# Patient Record
Sex: FEMALE | Race: WHITE | Employment: PART TIME | URBAN - METROPOLITAN AREA
[De-identification: names, ages, dates, MRNs, and addresses within clinical notes are randomized per-mention and may not be internally consistent; named-entity substitution may affect disease eponyms.]

---

## 2019-03-06 ENCOUNTER — TELEPHONE (OUTPATIENT)
Dept: PRIMARY CARE CLINIC | Age: 24
End: 2019-03-06

## 2019-03-06 ENCOUNTER — HOSPITAL ENCOUNTER (OUTPATIENT)
Dept: GENERAL RADIOLOGY | Age: 24
Discharge: HOME | End: 2019-03-06
Payer: COMMERCIAL

## 2019-03-06 ENCOUNTER — OFFICE VISIT (OUTPATIENT)
Dept: PRIMARY CARE CLINIC | Age: 24
End: 2019-03-06

## 2019-03-06 VITALS
DIASTOLIC BLOOD PRESSURE: 77 MMHG | SYSTOLIC BLOOD PRESSURE: 124 MMHG | HEIGHT: 69 IN | HEART RATE: 62 BPM | TEMPERATURE: 97.9 F | OXYGEN SATURATION: 98 % | RESPIRATION RATE: 18 BRPM | WEIGHT: 254.4 LBS | BODY MASS INDEX: 37.68 KG/M2

## 2019-03-06 DIAGNOSIS — Z83.3 FAMILY HISTORY OF DIABETES MELLITUS: ICD-10-CM

## 2019-03-06 DIAGNOSIS — S99.921A TOE INJURY, RIGHT, INITIAL ENCOUNTER: ICD-10-CM

## 2019-03-06 DIAGNOSIS — Z00.00 ENCOUNTER FOR WELL ADULT EXAM WITHOUT ABNORMAL FINDINGS: Primary | ICD-10-CM

## 2019-03-06 DIAGNOSIS — E66.01 SEVERE OBESITY (HCC): ICD-10-CM

## 2019-03-06 PROCEDURE — 73630 X-RAY EXAM OF FOOT: CPT

## 2019-03-06 RX ORDER — IBUPROFEN 600 MG/1
600 TABLET ORAL
Qty: 30 TAB | Refills: 0 | Status: SHIPPED | OUTPATIENT
Start: 2019-03-06

## 2019-03-06 RX ORDER — ASCORBIC ACID 250 MG
TABLET ORAL
COMMUNITY

## 2019-03-06 RX ORDER — BISMUTH SUBSALICYLATE 262 MG
1 TABLET,CHEWABLE ORAL DAILY
COMMUNITY

## 2019-03-06 RX ORDER — NORGESTIMATE AND ETHINYL ESTRADIOL 0.25-0.035
KIT ORAL
Refills: 5 | COMMUNITY
Start: 2019-02-27

## 2019-03-06 NOTE — PROGRESS NOTES
Please call patient:    Xray is normal, there is no fracture or dislocation. Treatment as discussed during visit. Follow-up if symptoms worsen or do not improve.

## 2019-03-06 NOTE — TELEPHONE ENCOUNTER
----- Message from Roxana Perez NP sent at 6/4/7642  3:59 PM EST -----  Please call patient:    Xray is normal, there is no fracture or dislocation. Treatment as discussed during visit. Follow-up if symptoms worsen or do not improve.

## 2019-03-06 NOTE — PROGRESS NOTES
HPI:     Chief Complaint   Patient presents with    Complete Physical    Toe Pain     hit right pinky toe yesterday, no has a bruise       Preethi Armas is a 26 y.o. female with no significant history who presents as new patient for annual exam as well as evaluation of right toe pain. Otherwise patient feels well today and has no concerns or complaints. Patient reports she stubbed her right 5th toe yesterday on a doorframe. She reports moderate pain, swelling and bruising afterwards. Reports pain has significantly improved from yesterday. Swelling and bruising is also better today. She is able to bear weight and ambulate without difficulty. She has been icing the area. Has not taken any OTC. Patient follows with OB/GYN Dr. Kesha Hobbs [facility]) for routine breast and gyne care. Reports pap in May 2018 was normal.  She is on Sprintec birth control for past approximately 5 years. LMP 2/25/19. Reports periods are normal, occur each month, and last about 7 days. Health Maintenance:   TDaP: thinks she got this before college 4-5 years ago   Pap smear: May 2018 with GYN; normal per patient report   Mammogram: not indicated; no family hx of breast or ovarian cancer  Colonoscopy: not indicated; no family hx of colorectal cancer     Healthy Habits:  Patient enjoys running. Goes for run 2-3 times per week when the weather is warm. Admits to limited exercise during colder months, but is excited to get outside again. Admits to somewhat unhealthy diet, eats out 1-2 times per week. Tries to avoid fried, fatty foods and has been trying to incorporate more fruits and vegetables. Pertient past medical hstory: no history of HTN, DVT, CAD, DM, liver disease, migraines or smoking.       Patient Active Problem List   Diagnosis Code    Severe obesity ([facility] [address] E66.01    Family history of diabetes mellitus Z83.3     Current Outpatient Medications   Medication Sig Dispense Refill    SPRINTEC, 28, 0.25-35 mg-mcg tab TAKE 1 TABLET BY MOUTH EVERY DAY  5    multivitamin (ONE A DAY) tablet Take 1 Tab by mouth daily.  ascorbic acid, vitamin C, (VITAMIN C) 250 mg tablet Take  by mouth.  ibuprofen (MOTRIN) 600 mg tablet Take 1 Tab by mouth every eight (8) hours as needed for Pain. 30 Tab 0     No Known Allergies     History reviewed. No pertinent past medical history. Past Surgical History:   Procedure Laterality Date    HX WISDOM TEETH EXTRACTION       Family History   Problem Relation Age of Onset    No Known Problems Mother     Diabetes Father         type 2    No Known Problems Brother     No Known Problems Maternal Grandmother     Kidney Disease Maternal Grandfather         dialysis    Heart Disease Maternal Grandfather     Heart Attack Paternal Grandmother          young   Kevin Parisian Hypertension Paternal Grandfather      Social History     Tobacco Use    Smoking status: Never Smoker    Smokeless tobacco: Never Used   Substance Use Topics    Alcohol use: Yes     Frequency: Monthly or less     Comment: socially          ROS:     ROS:  Feeling well. No dyspnea, palpitations, or chest pain on exertion. No abdominal pain, change in appetite, nausea, vomiting, change in bowel habits, black or bloody stools. No urinary tract symptoms. GYN ROS: normal menses, no abnormal bleeding, pelvic pain or discharge, no breast pain or new or enlarging lumps on self exam. No neurological complaints. No fever or chills. Otherwise, as documented in HPI. Physical Exam:     Vitals:    19 1426   BP: 124/77   Pulse: 62   Resp: 18   Temp: 97.9 °F (36.6 °C)   TempSrc: Oral   SpO2: 98%   Weight: 254 lb 6.4 oz (115.4 kg)   Height: 5' 8.5\" (1.74 m)        Nursing Documentation and Vital Signs Reviewed.      Physical Examination:   General appearance - alert, well appearing, and in no distress  Mental status - alert, oriented to person, place, and time, normal mood, behavior, speech, dress, motor activity, and thought processes  Eyes - pupils equal and reactive, extraocular eye movements intact  Ears - bilateral TM's and external ear canals normal  Nose - normal and patent, no erythema, discharge or polyps  Mouth - mucous membranes moist, pharynx normal without lesions  Neck - supple, no significant adenopathy, thyroid is normal in size without nodules or tenderness  Chest - clear to auscultation, no wheezes, rales or rhonchi, symmetric air entry  Heart - normal rate, regular rhythm, normal S1, S2, no murmurs, rubs, clicks or gallops  Abdomen - soft, nontender, nondistended, no masses or organomegaly  Neurological - alert, oriented, normal speech, no focal findings or movement disorder noted, DTR's normal and symmetric, normal muscle tone, no tremors, strength 5/5  Musculoskeletal - right 5th toe with slight swelling, bruising, mild tenderness over proximal phalanx. No tenderness with palpation of metatarsal bones. No obvious deformity. Ankle is normal.  Left foot is normal.    Extremities - peripheral pulses normal, no pedal edema, no clubbing or cyanosis      Assessment/ Plan:   Healthy adult female. Full age appropriate history and physical exam as well as health care maintenance  performed and discussed today. Risk factor modification discussed today includes safe sex practices, healthy diet and exercise, and seat belt use. Counseled on breast self-exam and mammography schedule. Pap smear schedule reviewed. Continue current medications as prescribed. Diagnoses and all orders for this visit:    1. Encounter for well adult exam without abnormal findings  -     CBC WITH AUTOMATED DIFF  -     HEMOGLOBIN A1C WITH EAG  -     LIPID PANEL  -     METABOLIC PANEL, COMPREHENSIVE  -     TSH AND FREE T4    2. Toe injury, right, initial encounter  -     Patient stubbed right 5th toe on door yesterday with subsequent tenderness, swelling, bruising. Has improved today. Will send for xray.     -     Start ibuprofen (MOTRIN) 600 mg tablet; Take 1 Tab by mouth every eight (8) hours as needed for Pain. Advised to take NSAID with food. CV and GI adverse effects discussed with patient. No history of PUD or GI bleed. Advised not to take ibuprofen and any other NSAID concurrently. Advised to take every 8 hours for next 2-3 days then as needed. -     Advised ice for 20 minute intervals four times daily for the next 2-3 days  -     XR FOOT RT MIN 3 V; Future    3. Family history of diabetes mellitus  -     HEMOGLOBIN A1C WITH EAG    4. Severe obesity ([address]  -     BMI discussed with patient. Discussed lifestyle changes, daily physical activity, and advised 150 minutes of exercise weekly. Discussed healthy diet choices and limiting fried, fatty foods, fast foods, processed foods, sugar-sweetened beverages/soda, and added sugars. Increase fruits, vegetables, low-fat dairy products, lean proteins, and whole grains.    -     HEMOGLOBIN A1C WITH EAG  -     LIPID PANEL      Follow-up Disposition:  Return if symptoms worsen or fail to improve. Discussed expected course/resolution/complications of diagnosis in detail with patient.    Medication risks/benefits/costs/interactions/alternatives discussed with patient.    Pt was given after visit summary which includes diagnoses, current medications & vitals.    Pt expressed understanding with the diagnosis and plan

## 2019-03-06 NOTE — PATIENT INSTRUCTIONS
Well Visit, Ages 25 to 48: Care Instructions  Your Care Instructions    Physical exams can help you stay healthy. Your doctor has checked your overall health and may have suggested ways to take good care of yourself. He or she also may have recommended tests. At home, you can help prevent illness with healthy eating, regular exercise, and other steps. Follow-up care is a key part of your treatment and safety. Be sure to make and go to all appointments, and call your doctor if you are having problems. It's also a good idea to know your test results and keep a list of the medicines you take. How can you care for yourself at home? · Reach and stay at a healthy weight. This will lower your risk for many problems, such as obesity, diabetes, heart disease, and high blood pressure. · Get at least 30 minutes of physical activity on most days of the week. Walking is a good choice. You also may want to do other activities, such as running, swimming, cycling, or playing tennis or team sports. Discuss any changes in your exercise program with your doctor. · Do not smoke or allow others to smoke around you. If you need help quitting, talk to your doctor about stop-smoking programs and medicines. These can increase your chances of quitting for good. · Talk to your doctor about whether you have any risk factors for sexually transmitted infections (STIs). Having one sex partner (who does not have STIs and does not have sex with anyone else) is a good way to avoid these infections. · Use birth control if you do not want to have children at this time. Talk with your doctor about the choices available and what might be best for you. · Protect your skin from too much sun. When you're outdoors from 10 a.m. to 4 p.m., stay in the shade or cover up with clothing and a hat with a wide brim. Wear sunglasses that block UV rays. Even when it's cloudy, put broad-spectrum sunscreen (SPF 30 or higher) on any exposed skin.   · See a dentist one or two times a year for checkups and to have your teeth cleaned. · Wear a seat belt in the car. · Drink alcohol in moderation, if at all. That means no more than 2 drinks a day for men and 1 drink a day for women. Follow your doctor's advice about when to have certain tests. These tests can spot problems early. For everyone  · Cholesterol. Have the fat (cholesterol) in your blood tested after age 21. Your doctor will tell you how often to have this done based on your age, family history, or other things that can increase your risk for heart disease. · Blood pressure. Have your blood pressure checked during a routine doctor visit. Your doctor will tell you how often to check your blood pressure based on your age, your blood pressure results, and other factors. · Vision. Talk with your doctor about how often to have a glaucoma test.  · Diabetes. Ask your doctor whether you should have tests for diabetes. · Colon cancer. Have a test for colon cancer at age 48. You may have one of several tests. If you are younger than 48, you may need a test earlier if you have any risk factors. Risk factors include whether you already had a precancerous polyp removed from your colon or whether your parent, brother, sister, or child has had colon cancer. For women  · Breast exam and mammogram. Talk to your doctor about when you should have a clinical breast exam and a mammogram. Medical experts differ on whether and how often women under 50 should have these tests. Your doctor can help you decide what is right for you. · Pap test and pelvic exam. Begin Pap tests at age 24. A Pap test is the best way to find cervical cancer. The test often is part of a pelvic exam. Ask how often to have this test.  · Tests for sexually transmitted infections (STIs). Ask whether you should have tests for STIs. You may be at risk if you have sex with more than one person, especially if your partners do not wear condoms.   For men  · Tests for sexually transmitted infections (STIs). Ask whether you should have tests for STIs. You may be at risk if you have sex with more than one person, especially if you do not wear a condom. · Testicular cancer exam. Ask your doctor whether you should check your testicles regularly. · Prostate exam. Talk to your doctor about whether you should have a blood test (called a PSA test) for prostate cancer. Experts differ on whether and when men should have this test. Some experts suggest it if you are older than 39 and are -American or have a father or brother who got prostate cancer when he was younger than 72. When should you call for help? Watch closely for changes in your health, and be sure to contact your doctor if you have any problems or symptoms that concern you. Where can you learn more? Go to http://davian-bo.info/. Enter P072 in the search box to learn more about \"Well Visit, Ages 25 to 48: Care Instructions. \"  Current as of: March 28, 2018  Content Version: 11.9  © 7026-6871 [employer]. Care instructions adapted under license by [employer] (which disclaims liability or warranty for this information). If you have questions about a medical condition or this instruction, always ask your healthcare professional. [place] [address] any warranty or liability for your use of this information. Foot Pain: Care Instructions  Your Care Instructions  Foot injuries that cause pain and swelling are fairly common. Almost all sports or home repair projects can cause a misstep that ends up as foot pain. Normal wear and tear, especially as you get older, also can cause foot pain. Most minor foot injuries will heal on their own, and home treatment is usually all you need to do. If you have a severe injury, you may need tests and treatment. Follow-up care is a key part of your treatment and safety.  Be sure to make and go to all appointments, and call your doctor if you are having problems. It's also a good idea to know your test results and keep a list of the medicines you take. How can you care for yourself at home? · Take pain medicines exactly as directed. ? If the doctor gave you a prescription medicine for pain, take it as prescribed. ? If you are not taking a prescription pain medicine, ask your doctor if you can take an over-the-counter medicine. · Rest and protect your foot. Take a break from any activity that may cause pain. · Put ice or a cold pack on your foot for 10 to 20 minutes at a time. Put a thin cloth between the ice and your skin. · Prop up the sore foot on a pillow when you ice it or anytime you sit or lie down during the next 3 days. Try to keep it above the level of your heart. This will help reduce swelling. · Your doctor may recommend that you wrap your foot with an elastic bandage. Keep your foot wrapped for as long as your doctor advises. · If your doctor recommends crutches, use them as directed. · Wear roomy footwear. · As soon as pain and swelling end, begin gentle exercises of your foot. Your doctor can tell you which exercises will help. When should you call for help? Call 911 anytime you think you may need emergency care. For example, call if:    · Your foot turns pale, white, blue, or cold.    Call your doctor now or seek immediate medical care if:    · You cannot move or stand on your foot.     · Your foot looks twisted or out of its normal position.     · Your foot is not stable when you step down.     · You have signs of infection, such as:  ? Increased pain, swelling, warmth, or redness. ? Red streaks leading from the sore area. ? Pus draining from a place on your foot.   ? A fever.     · Your foot is numb or tingly.    Watch closely for changes in your health, and be sure to contact your doctor if:    · You do not get better as expected.     · You have bruises from an injury that last longer than 2 weeks. Where can you learn more? Go to http://davian-bo.info/. Enter J712 in the search box to learn more about \"Foot Pain: Care Instructions. \"  Current as of: September 20, 2018  Content Version: 11.9  © 5078-7832 [employer]. Care instructions adapted under license by [employer] (which disclaims liability or warranty for this information). If you have questions about a medical condition or this instruction, always ask your healthcare professional. [address] any warranty or liability for your use of this information.

## 2020-01-15 ENCOUNTER — OFFICE VISIT (OUTPATIENT)
Dept: PRIMARY CARE CLINIC | Age: 25
End: 2020-01-15

## 2020-01-15 VITALS
SYSTOLIC BLOOD PRESSURE: 113 MMHG | HEART RATE: 53 BPM | RESPIRATION RATE: 16 BRPM | HEIGHT: 69 IN | BODY MASS INDEX: 34.45 KG/M2 | TEMPERATURE: 98.2 F | WEIGHT: 232.6 LBS | OXYGEN SATURATION: 99 % | DIASTOLIC BLOOD PRESSURE: 72 MMHG

## 2020-01-15 DIAGNOSIS — N89.8 VAGINAL PRURITUS: ICD-10-CM

## 2020-01-15 DIAGNOSIS — N89.8 VAGINAL DISCHARGE: Primary | ICD-10-CM

## 2020-01-15 RX ORDER — FLUCONAZOLE 150 MG/1
150 TABLET ORAL ONCE
Qty: 2 TAB | Refills: 0 | Status: SHIPPED | OUTPATIENT
Start: 2020-01-15 | End: 2020-01-15

## 2020-01-15 NOTE — PROGRESS NOTES
HPI:     Chief Complaint   Patient presents with    Vaginal Itching     itching and irritation, having white discharge, no odor, noticed it on sunday. Patient is a 22 y.o. female with no significant history who presents for evaluation of vaginal pruritus. Patient reports 3 day history of vaginal irritation, itching, and odorless white discharge. Denies dysuria or urinary symptoms. Denies fever, chills, suprapubic/abdominal pain, vaginal bleeding, nausea, vomiting. Patient Active Problem List   Diagnosis Code    [facility] E66.01    Family history of diabetes mellitus Z83.3     Current Outpatient Medications   Medication Sig Dispense Refill    fluconazole (DIFLUCAN) 150 mg tablet Take 1 Tab by mouth once for 1 dose. May repeat in 3-4 days if symptoms persist.  FDA advises cautious during pregnancy. 2 Tab 0    SPRINTEC, 28, 0.25-35 mg-mcg tab TAKE 1 TABLET BY MOUTH EVERY DAY  5    multivitamin (ONE A DAY) tablet Take 1 Tab by mouth daily.  ascorbic acid, vitamin C, (VITAMIN C) 250 mg tablet Take  by mouth.  ibuprofen (MOTRIN) 600 mg tablet Take 1 Tab by mouth every eight (8) hours as needed for Pain. 30 Tab 0     No Known Allergies  History reviewed. No pertinent past medical history. ROS:   Pertinent items are noted in HPI. Objective:     Vitals:    01/15/20 0946   BP: 113/72   Pulse: (!) 53   Resp: 16   Temp: 98.2 °F (36.8 °C)   TempSrc: Oral   SpO2: 99%   Weight: 232 lb 9.6 oz (105.5 kg)   Height: 5' 8.5\" (1.74 m)        Vitals and Nurse Documentation reviewed.     Physical Examination:   General appearance - alert, well appearing, and in no distress  Mental status - alert, oriented to person, place, and time, normal mood, behavior, speech, dress, motor activity, and thought processes  Chest - clear to auscultation, no wheezes, rales or rhonchi, symmetric air entry  Heart - normal rate, regular rhythm, normal S1, S2, no murmurs, rubs, clicks or gallops  Abdomen - soft, nontender, nondistended, no masses or organomegaly  Pelvic - VULVA: normal appearing vulva with no masses, tenderness or lesions, VAGINA: normal appearing vagina with moderate odorless white discharge noted, CERVIX: normal appearing cervix with odorless white discharge noted, UTERUS: uterus is normal size, shape, consistency and nontender, ADNEXA: normal adnexa in size, nontender and no masses, exam chaperoned by Zenobia Macdonald      Assessment/ Plan:   Diagnoses and all orders for this visit:    1. Vaginal discharge  -     NUSWAB VAGINITIS PLUS. Will notify of results and recommendation.   -     fluconazole (DIFLUCAN) 150 mg tablet; Take 1 Tab by mouth once for 1 dose. May repeat in 3-4 days if symptoms persist.  FDA advises cautious during pregnancy. Medication benefits, risks, indication, dosage, potential adverse effects, and alternate medication options were discussed with patient who expressed understanding. 2. Vaginal pruritus  -     NUSWAB VAGINITIS PLUS  -     fluconazole (DIFLUCAN) 150 mg tablet; Take 1 Tab by mouth once for 1 dose. May repeat in 3-4 days if symptoms persist.  FDA advises cautious during pregnancy. Follow-up and Dispositions    · Return if symptoms worsen or fail to improve. I have discussed the diagnosis with the patient and the intended plan as seen in the above orders. Advised prompt follow-up if symptoms worsen or fail to improve and symptoms that would warrant emergent evaluation in ED. The patient has received an after-visit summary and questions were answered concerning future plans. I have discussed medication side effects and warnings with the patient as well. Patient expressed understanding and is in agreement with the diagnosis and plan.

## 2020-01-15 NOTE — PROGRESS NOTES
Ilene Granado is a 22 y.o. female    Chief Complaint   Patient presents with    Vaginal Itching     itching and irritation, having white discharge, no odor, noticed it on sunday. 1. Have you been to the ER, urgent care clinic since your last visit? Hospitalized since your last visit? No    2. Have you seen or consulted any other health care providers outside of the [address] [place] [address] since your last visit? Include any pap smears or colon screening. No    No flowsheet data found.      Health Maintenance Due   Topic Date Due    HPV Age 9Y-34Y (3 - Female 2-dose series) 07/09/2007    DTaP/Tdap/Td series (1 - Tdap) 07/09/2007    Influenza Age 5 to Adult  08/01/2019

## 2020-01-18 LAB
A VAGINAE DNA VAG QL NAA+PROBE: NORMAL SCORE
BVAB2 DNA VAG QL NAA+PROBE: NORMAL SCORE
C ALBICANS DNA VAG QL NAA+PROBE: NEGATIVE
C GLABRATA DNA VAG QL NAA+PROBE: NEGATIVE
C TRACH RRNA SPEC QL NAA+PROBE: NEGATIVE
MEGA1 DNA VAG QL NAA+PROBE: NORMAL SCORE
N GONORRHOEA RRNA SPEC QL NAA+PROBE: NEGATIVE
T VAGINALIS RRNA SPEC QL NAA+PROBE: NEGATIVE

## 2020-07-22 ENCOUNTER — OFFICE VISIT (OUTPATIENT)
Dept: PRIMARY CARE CLINIC | Age: 25
End: 2020-07-22

## 2020-07-22 VITALS
OXYGEN SATURATION: 97 % | HEIGHT: 68 IN | HEART RATE: 66 BPM | RESPIRATION RATE: 18 BRPM | DIASTOLIC BLOOD PRESSURE: 72 MMHG | TEMPERATURE: 97.6 F | BODY MASS INDEX: 28.55 KG/M2 | WEIGHT: 188.4 LBS | SYSTOLIC BLOOD PRESSURE: 114 MMHG

## 2020-07-22 DIAGNOSIS — Z78.9 HEPATITIS B VACCINATION STATUS UNKNOWN: ICD-10-CM

## 2020-07-22 DIAGNOSIS — E55.9 VITAMIN D DEFICIENCY: ICD-10-CM

## 2020-07-22 DIAGNOSIS — Z22.7 LATENT TUBERCULOSIS BY BLOOD TEST: ICD-10-CM

## 2020-07-22 DIAGNOSIS — Z00.00 ANNUAL PHYSICAL EXAM: Primary | ICD-10-CM

## 2020-07-22 NOTE — PROGRESS NOTES
Yue Guerrero is a 26 y.o.  female and presents with     Chief Complaint   Patient presents with    Physical     Pt is here for a physical. 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 She feels fine  She needs form filled. No past medical history on file. Past Surgical History:   Procedure Laterality Date    HX WISDOM TEETH EXTRACTION       Current Outpatient Medications   Medication Sig    SPRINTEC, 28, 0.25-35 mg-mcg tab TAKE 1 TABLET BY MOUTH EVERY DAY    multivitamin (ONE A DAY) tablet Take 1 Tab by mouth daily.  ascorbic acid, vitamin C, (VITAMIN C) 250 mg tablet Take  by mouth.  ibuprofen (MOTRIN) 600 mg tablet Take 1 Tab by mouth every eight (8) hours as needed for Pain. No current facility-administered medications for this visit. Health Maintenance   Topic Date Due    HPV Age 9Y-34Y (1 - 2-dose series) 2007    DTaP/Tdap/Td series (1 - Tdap) 2017    Influenza Age 5 to Adult  2020    PAP AKA CERVICAL CYTOLOGY  2022    Pneumococcal 0-64 years  Aged [employer] History   Administered Date(s) Administered    Hep B Vaccine (Adult) 2020    Influenza Vaccine 10/06/2018, 2019     Patient's last menstrual period was 2020 (exact date). Allergies and Intolerances:   No Known Allergies    Family History:   Family History   Problem Relation Age of Onset    No Known Problems Mother     Diabetes Father         type 2    No Known Problems Brother     No Known Problems Maternal Grandmother     Kidney Disease Maternal Grandfather         dialysis    Heart Disease Maternal Grandfather     Heart Attack Paternal Grandmother          young   [address] [facility] [address] Hypertension Paternal Grandfather        Social History:   She  reports that she has never smoked. She has never used smokeless tobacco.  She  reports current alcohol use.             Review of Systems:   General: negative for - chills, fatigue, fever, weight change  Psych: negative for - anxiety, depression, irritability or mood swings  ENT: negative for - headaches, hearing change, nasal congestion, oral lesions, sneezing or sore throat  Heme/ Lymph: negative for - bleeding problems, bruising, pallor or swollen lymph nodes  Endo: negative for - hot flashes, polydipsia/polyuria or temperature intolerance  Resp: negative for - cough, shortness of breath or wheezing  CV: negative for - chest pain, edema or palpitations  GI: negative for - abdominal pain, change in bowel habits, constipation, diarrhea or nausea/vomiting  : negative for - dysuria, hematuria, incontinence, pelvic pain or vulvar/vaginal symptoms  MSK: negative for - joint pain, joint swelling or muscle pain  Neuro: negative for - confusion, headaches, seizures or weakness  Derm: negative for - dry skin, hair changes, rash or skin lesion changes          Physical:   Vitals:   Vitals:    07/22/20 1035   BP: 114/72   Pulse: 66   Resp: 18   Temp: 97.6 °F (36.4 °C)   TempSrc: Temporal   SpO2: 97%   Weight: 188 lb 6.4 oz (85.5 kg)   Height: 5' 8\" (1.727 m)           Exam:   HEENT- atraumatic,normocephalic, awake, oriented, well nourished  Neck - supple,no enlarged lymph nodes, no JVD, no thyromegaly  Chest- CTA, no rhonchi, no crackles  Heart- rrr, no murmurs / gallop/rub  Abdomen- soft,BS+,NT, no hepatosplenomegaly  Ext - no c/c/edema   Neuro- no focal deficits. Power 5/5 all extremities  Skin - warm,dry, no obvious rashes.           Review of Data:   LABS:   Lab Results   Component Value Date/Time    WBC 7.0 07/22/2020 11:24 AM    HGB 12.8 07/22/2020 11:24 AM    HCT 37.9 07/22/2020 11:24 AM    PLATELET 285 03/09/2168 11:24 AM     Lab Results   Component Value Date/Time    Sodium 141 07/22/2020 11:24 AM    Potassium 4.6 07/22/2020 11:24 AM    Chloride 100 07/22/2020 11:24 AM    CO2 26 07/22/2020 11:24 AM    Glucose 90 07/22/2020 11:24 AM    BUN 12 07/22/2020 11:24 AM    Creatinine 0.77 07/22/2020 11:24 AM     Lab Results Component Value Date/Time    Cholesterol, total 247 (H) 07/22/2020 11:24 AM    HDL Cholesterol 62 07/22/2020 11:24 AM    LDL, calculated 152 (H) 07/22/2020 11:24 AM    Triglyceride 166 (H) 07/22/2020 11:24 AM     No components found for: GPT        Impression / Plan:        ICD-10-CM ICD-9-CM    1. Annual physical exam  Z00.00 V70.0 CBC WITH AUTOMATED DIFF      METABOLIC PANEL, COMPREHENSIVE      LIPID PANEL      TSH 3RD GENERATION   2. Latent tuberculosis by blood test  Z22.7 790.6 QUANTIFERON-TB GOLD PLUS   3. Vitamin D deficiency  E55.9 268.9 VITAMIN D, 25 HYDROXY   4. Hepatitis B vaccination status unknown  Z78.9 V49.89 HEPATITIS B SURF AB QUANT         Explained to patient risk benefits of the medications. Advised patient to stop meds if having any side effects. Pt verbalized understanding of the instructions. I have discussed the diagnosis with the patient and the intended plan as seen in the above orders. The patient has received an after-visit summary and questions were answered concerning future plans. I have discussed medication side effects and warnings with the patient as well. I have reviewed the plan of care with the patient, accepted their input and they are in agreement with the treatment goals. Reviewed plan of care. Patient has provided input and agrees with goals.         Deepthi Atwood MD

## 2020-07-23 ENCOUNTER — TELEPHONE (OUTPATIENT)
Dept: PRIMARY CARE CLINIC | Age: 25
End: 2020-07-23

## 2020-07-23 NOTE — TELEPHONE ENCOUNTER
Pt calling in regards to her Physical form and immunization records. She wanted to provide the fax number the forms need to be sent to when completed. ATTN:   [address] [address]: [phone]    Pt would also like a call when forms are completed and would like to  a copy for herself.

## 2020-07-24 NOTE — TELEPHONE ENCOUNTER
Paper work has been given to Dr. Jacobo Hernandes, paper work to be fully completed on Monday with upon Dr. Jacobo Hernandes return

## 2020-07-25 LAB
25(OH)D3+25(OH)D2 SERPL-MCNC: 67.2 NG/ML (ref 30–100)
ALBUMIN SERPL-MCNC: 4.7 G/DL (ref 3.9–5)
ALBUMIN/GLOB SERPL: 2 {RATIO} (ref 1.2–2.2)
ALP SERPL-CCNC: 70 IU/L (ref 39–117)
ALT SERPL-CCNC: 20 IU/L (ref 0–32)
AST SERPL-CCNC: 20 IU/L (ref 0–40)
BASOPHILS # BLD AUTO: 0.1 X10E3/UL (ref 0–0.2)
BASOPHILS NFR BLD AUTO: 1 %
BILIRUB SERPL-MCNC: 0.5 MG/DL (ref 0–1.2)
BUN SERPL-MCNC: 12 MG/DL (ref 6–20)
BUN/CREAT SERPL: 16 (ref 9–23)
CALCIUM SERPL-MCNC: 10.4 MG/DL (ref 8.7–10.2)
CHLORIDE SERPL-SCNC: 100 MMOL/L (ref 96–106)
CHOLEST SERPL-MCNC: 247 MG/DL (ref 100–199)
CO2 SERPL-SCNC: 26 MMOL/L (ref 20–29)
CREAT SERPL-MCNC: 0.77 MG/DL (ref 0.57–1)
EOSINOPHIL # BLD AUTO: 0.1 X10E3/UL (ref 0–0.4)
EOSINOPHIL NFR BLD AUTO: 2 %
ERYTHROCYTE [DISTWIDTH] IN BLOOD BY AUTOMATED COUNT: 12.8 % (ref 11.7–15.4)
GAMMA INTERFERON BACKGROUND BLD IA-ACNC: 0.02 IU/ML
GLOBULIN SER CALC-MCNC: 2.4 G/DL (ref 1.5–4.5)
GLUCOSE SERPL-MCNC: 90 MG/DL (ref 65–99)
HBV SURFACE AB SER-ACNC: <3.1 MIU/ML
HCT VFR BLD AUTO: 37.9 % (ref 34–46.6)
HDLC SERPL-MCNC: 62 MG/DL
HGB BLD-MCNC: 12.8 G/DL (ref 11.1–15.9)
IMM GRANULOCYTES # BLD AUTO: 0 X10E3/UL (ref 0–0.1)
IMM GRANULOCYTES NFR BLD AUTO: 0 %
LDLC SERPL CALC-MCNC: 152 MG/DL (ref 0–99)
LYMPHOCYTES # BLD AUTO: 2.6 X10E3/UL (ref 0.7–3.1)
LYMPHOCYTES NFR BLD AUTO: 37 %
M TB IFN-G BLD-IMP: NEGATIVE
M TB IFN-G CD4+ BCKGRND COR BLD-ACNC: 0.09 IU/ML
MCH RBC QN AUTO: 30.7 PG (ref 26.6–33)
MCHC RBC AUTO-ENTMCNC: 33.8 G/DL (ref 31.5–35.7)
MCV RBC AUTO: 91 FL (ref 79–97)
MITOGEN IGNF BLD-ACNC: >10 IU/ML
MONOCYTES # BLD AUTO: 0.5 X10E3/UL (ref 0.1–0.9)
MONOCYTES NFR BLD AUTO: 6 %
NEUTROPHILS # BLD AUTO: 3.8 X10E3/UL (ref 1.4–7)
NEUTROPHILS NFR BLD AUTO: 54 %
PLATELET # BLD AUTO: 289 X10E3/UL (ref 150–450)
POTASSIUM SERPL-SCNC: 4.6 MMOL/L (ref 3.5–5.2)
PROT SERPL-MCNC: 7.1 G/DL (ref 6–8.5)
QUANTIFERON INCUBATION, QF1T: NORMAL
QUANTIFERON TB2 AG: 0.08 IU/ML
RBC # BLD AUTO: 4.17 X10E6/UL (ref 3.77–5.28)
SERVICE CMNT-IMP: NORMAL
SODIUM SERPL-SCNC: 141 MMOL/L (ref 134–144)
TRIGL SERPL-MCNC: 166 MG/DL (ref 0–149)
TSH SERPL DL<=0.005 MIU/L-ACNC: 3.52 UIU/ML (ref 0.45–4.5)
VLDLC SERPL CALC-MCNC: 33 MG/DL (ref 5–40)
WBC # BLD AUTO: 7 X10E3/UL (ref 3.4–10.8)

## 2020-07-27 NOTE — TELEPHONE ENCOUNTER
----- Message from Elliott Fajardo MD sent at 7/26/2020  9:40 PM EDT -----  Chol is elevated. Would advise diet, exercise, low fat diet. Bad chol lDL- 152, should be less than 100  Serum calcium is slightly elevated , I have ordered ionized calcium and intact PTH. Hep B titers are low.  Pt will need repeat vaccination series for Hep B

## 2020-07-27 NOTE — PROGRESS NOTES
Chol is elevated. Would advise diet, exercise, low fat diet. Bad chol lDL- 152, should be less than 100  Serum calcium is slightly elevated , I have ordered ionized calcium and intact PTH. Hep B titers are low.  Pt will need repeat vaccination series for Hep B

## 2020-07-27 NOTE — TELEPHONE ENCOUNTER
Message has been conveyed to patient per Dr. Rose Rizvi. Patient has verbalized understanding and no further questions were asked.  Patient to call office for nurse visit and lab appt

## 2020-07-29 ENCOUNTER — CLINICAL SUPPORT (OUTPATIENT)
Dept: PRIMARY CARE CLINIC | Age: 25
End: 2020-07-29

## 2020-07-29 VITALS
DIASTOLIC BLOOD PRESSURE: 67 MMHG | HEIGHT: 69 IN | TEMPERATURE: 98 F | SYSTOLIC BLOOD PRESSURE: 107 MMHG | BODY MASS INDEX: 27.99 KG/M2 | OXYGEN SATURATION: 98 % | WEIGHT: 189 LBS | RESPIRATION RATE: 15 BRPM | HEART RATE: 59 BPM

## 2020-07-29 DIAGNOSIS — E83.52 HYPERCALCEMIA: Primary | ICD-10-CM

## 2020-07-29 DIAGNOSIS — Z23 NEED FOR HEPATITIS B VACCINATION: ICD-10-CM

## 2020-07-29 DIAGNOSIS — Z23 ENCOUNTER FOR IMMUNIZATION: Primary | ICD-10-CM

## 2020-07-29 NOTE — PROGRESS NOTES
Hep B Immunization administered 7/29/2020 by Jelly Acuña with patient's consent in left arm. Patient tolerated procedure well. No reactions noted.

## 2020-07-30 LAB
CA-I SERPL ISE-MCNC: 5.2 MG/DL (ref 4.5–5.6)
PTH-INTACT SERPL-MCNC: 12 PG/ML (ref 15–65)

## 2020-07-31 ENCOUNTER — TELEPHONE (OUTPATIENT)
Dept: PRIMARY CARE CLINIC | Age: 25
End: 2020-07-31

## 2020-07-31 NOTE — PROGRESS NOTES
Ionized calcium is normal .Intact PTH is low . However I would be concerned if it was high.  NO concerns at this time

## 2020-07-31 NOTE — TELEPHONE ENCOUNTER
Message has been conveyed to patient per Dr. Troy Balderrama.  Patient has verbalized understanding and no further questions were asekd

## 2020-07-31 NOTE — TELEPHONE ENCOUNTER
----- Message from Amanda Gonzalez MD sent at 7/31/2020 12:48 PM EDT -----  Ionized calcium is normal .Intact PTH is low . However I would be concerned if it was high.  NO concerns at this time

## 2020-08-21 PROBLEM — Z00.00 ANNUAL PHYSICAL EXAM: Status: RESOLVED | Noted: 2020-07-22 | Resolved: 2020-08-21

## 2022-03-18 PROBLEM — E66.01 SEVERE OBESITY (HCC): Status: ACTIVE | Noted: 2019-03-06

## 2022-03-19 PROBLEM — Z83.3 FAMILY HISTORY OF DIABETES MELLITUS: Status: ACTIVE | Noted: 2019-03-06

## 2023-05-20 RX ORDER — ASCORBIC ACID 250 MG
TABLET ORAL
COMMUNITY

## 2023-05-20 RX ORDER — NORGESTIMATE AND ETHINYL ESTRADIOL 0.25-0.035
1 KIT ORAL DAILY
COMMUNITY
Start: 2019-02-27

## 2023-05-20 RX ORDER — IBUPROFEN 600 MG/1
600 TABLET ORAL EVERY 8 HOURS PRN
COMMUNITY
Start: 2019-03-06